# Patient Record
Sex: FEMALE | Race: WHITE
[De-identification: names, ages, dates, MRNs, and addresses within clinical notes are randomized per-mention and may not be internally consistent; named-entity substitution may affect disease eponyms.]

---

## 2018-04-25 ENCOUNTER — HOSPITAL ENCOUNTER (EMERGENCY)
Dept: HOSPITAL 74 - JERFT | Age: 23
Discharge: HOME | End: 2018-04-25
Payer: COMMERCIAL

## 2018-04-25 VITALS — BODY MASS INDEX: 24.5 KG/M2

## 2018-04-25 VITALS — DIASTOLIC BLOOD PRESSURE: 60 MMHG | TEMPERATURE: 97.8 F | HEART RATE: 70 BPM | SYSTOLIC BLOOD PRESSURE: 104 MMHG

## 2018-04-25 DIAGNOSIS — H10.89: Primary | ICD-10-CM

## 2018-04-25 NOTE — PDOC
History of Present Illness





- General


Chief Complaint: Eye Problem


Stated Complaint: EYE IRRITATION/REDNESS


Time Seen by Provider: 04/25/18 09:31


History Source: Patient


Exam Limitations: No Limitations





- History of Present Illness


Initial Comments: 





04/25/18 10:13


CHIEF COMPLAINT: Bilateral eye drainage and pruritus with redness





HISTORY OF PRESENT ILLNESS: Patient is a 22-year-old female, no significant 

medical history currently on no medication presents for bilateral eye drainage, 

redness with pruritus. Patient reports 2 days ago she was working with plant 

cutting them down, noticed I started, itchy and was driving her eyes, 

progressively worsened over the next couple of days.





REVIEW OF SYSTEMS:


GENERAL/CONSTITUTIONAL: No fever or chills. No weakness. No weight change.


HEAD, EYES, EARS, NOSE AND THROAT: No change in vision. Drainage and pruritus 

to bilateral eye. No ear pain or discharge. No sore throat.


RESPIRATORY: No cough, wheezing, or hemoptysis.


SKIN : No rash or easy bruising.


NEUROLOGIC: No headache, vertigo, loss of consciousness, or loss of sensation.


HEMATOLOGIC/LYMPHATIC: No lymphadenopathy


ALLERGIC/IMMUNOLOGIC: No hives or skin allergy. No latex allergy.





PHYSICAL EXAM:


GENERAL: The patient is awake, alert, and fully oriented, in no acute distress.


HEAD: Normal with no signs of trauma. 


EYES: Pupils equal, round and reactive to light, extraocular movements intact, 

sclera anicteric, conjunctiva injected, extending to limbus after fluorescein 

staining, no corneal abrasion noted.


ENT: Ears normal, nares patent, oropharynx clear without exudates.  Moist 

mucous membranes.


NECK: Normal range of motion, supple without lymphadenopathy, JVD, or masses.


LUNGS: Breath sounds equal, clear to auscultation bilaterally.  No wheezes, and 

no crackles.


NEUROLOGICAL: Cranial nerves II through XII grossly intact.  Normal speech, 

normal gait.


SKIN: No erythema no facial edema. Warm, Dry, normal turgor, no rashes or 

lesions noted.





Past History





- Past Medical History


Allergies/Adverse Reactions: 


 Allergies











Allergy/AdvReac Type Severity Reaction Status Date / Time


 


No Known Allergies Allergy   Verified 04/25/18 09:28











Home Medications: 


Ambulatory Orders





Loratadine [Claritin -] 10 mg PO DAILY #30 tablet 04/25/18 


Moxifloxacin HCl [Vigamox 0.5% Eye Drops -] 1 drop OU TID #1 bottle 04/25/18 








COPD: No


DVT: No


Dementia: No





- Immunization History


Immunization Up to Date: Yes





- Suicide/Smoking/Psychosocial Hx


Smoking History: Never smoked


Have you smoked in the past 12 months: No


Information on smoking cessation initiated: No


Hx Alcohol Use: No


Drug/Substance Use Hx: No


Substance Use Type: None





*Physical Exam





- Vital Signs


 Last Vital Signs











Temp Pulse Resp BP Pulse Ox


 


 97.8 F   70   16   104/60   98 


 


 04/25/18 09:29  04/25/18 09:29  04/25/18 09:29  04/25/18 09:29  04/25/18 09:29














Medical Decision Making





- Medical Decision Making





04/25/18 10:14


A/P: Patient with bilateral conjunctivitis will DC on the Vigamox, due to 

working around organic matter.  Will start also on Claritin, I have advised 

patient to use allergy drops when working around plants.  Follow up with 

ophthalmology in 2 days if symptoms persist





*DC/Admit/Observation/Transfer


Diagnosis at time of Disposition: 


Conjunctivitis


Qualifiers:


 Conjunctivitis type: other Laterality: bilateral Qualified Code(s): H10.89 - 

Other conjunctivitis








- Discharge Dispostion


Disposition: HOME


Condition at time of disposition: Stable


Admit: No





- Prescriptions


Prescriptions: 


Loratadine [Claritin -] 10 mg PO DAILY #30 tablet


Moxifloxacin HCl [Vigamox 0.5% Eye Drops -] 1 drop OU TID #1 bottle





- Referrals





- Patient Instructions


Printed Discharge Instructions:  Conjunctivitis


Additional Instructions: 


* Refrain from touching or scratching eye


* Please wash hands frequently


* Please followup with his primary care doctor in 2 days if symptoms persist


* Medication as prescribed


* Warm compresses to eye


* If increased redness, swelling, pain to the eye please follow up with primary 

care doctor immediately or return to emergency room





- Post Discharge Activity


Forms/Work/School Notes:  Back to Work

## 2022-03-31 ENCOUNTER — HOSPITAL ENCOUNTER (EMERGENCY)
Dept: HOSPITAL 74 - JER | Age: 27
LOS: 1 days | Discharge: HOME | End: 2022-04-01
Payer: COMMERCIAL

## 2022-03-31 VITALS — BODY MASS INDEX: 27 KG/M2

## 2022-03-31 VITALS — TEMPERATURE: 97.8 F | HEART RATE: 61 BPM | DIASTOLIC BLOOD PRESSURE: 84 MMHG | SYSTOLIC BLOOD PRESSURE: 128 MMHG

## 2022-03-31 DIAGNOSIS — R07.9: Primary | ICD-10-CM

## 2022-03-31 LAB
ALBUMIN SERPL-MCNC: 4 G/DL (ref 3.4–5)
ALP SERPL-CCNC: 102 U/L (ref 45–117)
ALT SERPL-CCNC: 72 U/L (ref 13–61)
ANION GAP SERPL CALC-SCNC: 7 MMOL/L (ref 8–16)
APTT BLD: 28.7 SECONDS (ref 25.2–36.5)
AST SERPL-CCNC: 160 U/L (ref 15–37)
BASOPHILS # BLD: 0.3 % (ref 0–2)
BILIRUB SERPL-MCNC: 0.6 MG/DL (ref 0.2–1)
BUN SERPL-MCNC: 15.4 MG/DL (ref 7–18)
CALCIUM SERPL-MCNC: 9 MG/DL (ref 8.5–10.1)
CHLORIDE SERPL-SCNC: 106 MMOL/L (ref 98–107)
CO2 SERPL-SCNC: 25 MMOL/L (ref 21–32)
CREAT SERPL-MCNC: 0.7 MG/DL (ref 0.55–1.3)
DEPRECATED RDW RBC AUTO: 12.8 % (ref 11.6–15.6)
EOSINOPHIL # BLD: 0.6 % (ref 0–4.5)
GLUCOSE SERPL-MCNC: 154 MG/DL (ref 74–106)
HCT VFR BLD CALC: 39.4 % (ref 32.4–45.2)
HGB BLD-MCNC: 13.4 GM/DL (ref 10.7–15.3)
INR BLD: 1.1 (ref 0.83–1.09)
LIPASE SERPL-CCNC: 156 U/L (ref 73–393)
LYMPHOCYTES # BLD: 10 % (ref 8–40)
MAGNESIUM SERPL-MCNC: 2.4 MG/DL (ref 1.8–2.4)
MCH RBC QN AUTO: 30.2 PG (ref 25.7–33.7)
MCHC RBC AUTO-ENTMCNC: 33.9 G/DL (ref 32–36)
MCV RBC: 89.1 FL (ref 80–96)
MONOCYTES # BLD AUTO: 4.1 % (ref 3.8–10.2)
NEUTROPHILS # BLD: 85 % (ref 42.8–82.8)
PLATELET # BLD AUTO: 356 10^3/UL (ref 134–434)
PMV BLD: 7.7 FL (ref 7.5–11.1)
PROT SERPL-MCNC: 7.3 G/DL (ref 6.4–8.2)
PT PNL PPP: 12.7 SEC (ref 9.7–13)
RBC # BLD AUTO: 4.42 M/MM3 (ref 3.6–5.2)
SODIUM SERPL-SCNC: 137 MMOL/L (ref 136–145)
WBC # BLD AUTO: 9 K/MM3 (ref 4–10)

## 2022-03-31 PROCEDURE — 3E033GC INTRODUCTION OF OTHER THERAPEUTIC SUBSTANCE INTO PERIPHERAL VEIN, PERCUTANEOUS APPROACH: ICD-10-PCS

## 2022-04-01 LAB
APPEARANCE UR: CLEAR
BACTERIA # UR AUTO: 4832 /UL (ref 0–1359)
BILIRUB UR STRIP.AUTO-MCNC: NEGATIVE MG/DL
CASTS URNS QL MICRO: 43 /UL (ref 0–3.1)
COLOR UR: YELLOW
EPITH CASTS URNS QL MICRO: >36 /UL (ref 0–25.1)
KETONES UR QL STRIP: NEGATIVE
LEUKOCYTE ESTERASE UR QL STRIP.AUTO: NEGATIVE
NITRITE UR QL STRIP: NEGATIVE
PH UR: >= 9 [PH] (ref 5–8)
PROT UR QL STRIP: (no result)
PROT UR QL STRIP: NEGATIVE
RBC # BLD AUTO: 5 /UL (ref 0–23.9)
SP GR UR: >= 1.099 (ref 1.01–1.03)
UROBILINOGEN UR STRIP-MCNC: 1 MG/DL (ref 0.2–1)
WBC # UR AUTO: 150.2 /UL (ref 0–25.8)